# Patient Record
Sex: MALE | Race: WHITE | Employment: FULL TIME | URBAN - METROPOLITAN AREA
[De-identification: names, ages, dates, MRNs, and addresses within clinical notes are randomized per-mention and may not be internally consistent; named-entity substitution may affect disease eponyms.]

---

## 2018-11-23 ENCOUNTER — OFFICE VISIT (OUTPATIENT)
Dept: FAMILY MEDICINE | Facility: CLINIC | Age: 28
End: 2018-11-23
Payer: COMMERCIAL

## 2018-11-23 VITALS
HEART RATE: 98 BPM | WEIGHT: 164.4 LBS | TEMPERATURE: 98.7 F | DIASTOLIC BLOOD PRESSURE: 68 MMHG | HEIGHT: 70 IN | SYSTOLIC BLOOD PRESSURE: 114 MMHG | OXYGEN SATURATION: 98 % | BODY MASS INDEX: 23.54 KG/M2

## 2018-11-23 DIAGNOSIS — F90.2 ATTENTION DEFICIT HYPERACTIVITY DISORDER (ADHD), COMBINED TYPE: Primary | ICD-10-CM

## 2018-11-23 PROCEDURE — 99213 OFFICE O/P EST LOW 20 MIN: CPT | Performed by: NURSE PRACTITIONER

## 2018-11-23 RX ORDER — DEXTROAMPHETAMINE SACCHARATE, AMPHETAMINE ASPARTATE, DEXTROAMPHETAMINE SULFATE AND AMPHETAMINE SULFATE 5; 5; 5; 5 MG/1; MG/1; MG/1; MG/1
TABLET ORAL
Refills: 0 | COMMUNITY
Start: 2018-08-27 | End: 2018-11-23

## 2018-11-23 RX ORDER — DEXTROAMPHETAMINE SACCHARATE, AMPHETAMINE ASPARTATE, DEXTROAMPHETAMINE SULFATE AND AMPHETAMINE SULFATE 5; 5; 5; 5 MG/1; MG/1; MG/1; MG/1
20 TABLET ORAL 2 TIMES DAILY
Qty: 60 TABLET | Refills: 0 | Status: SHIPPED | OUTPATIENT
Start: 2018-11-23 | End: 2018-12-20

## 2018-11-23 NOTE — PROGRESS NOTES
SUBJECTIVE:   Jemal Fu is a 28 year old male who presents to clinic today for the following health issues:  New Patient/Transfer of Care  [facility] . Switching due to insurance change. *records request completed    Would like to restart adderall 20mg twice daily. 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 Denies: tachycardia, decreased appetite, HA's or insomnia.                                                                                                      Some-                                                                        Never   Rarely      times       Often  Very Often  1. How often do you have trouble wrapping up the final details of a project,  once the challenging parts have been done?   X     2. How often do you have difficulty getting things in order when you have to do a task that requires organization?   X     3. How often do you have problems remembering appointments or obligations?    X    4. When you have a task that requires a lot of thought, how often do you avoid or delay getting started?   X     5. How often do you fidget or squirm with your hands or feet when you have to sit down for a long time?     X   6. How often do you feel overly active and compelled to do things, like you were driven by a motor?    X      Part A                                                        7. How often do you make careless mistakes when you have to work on a boring or difficult project?     X   8. How often do you have difficulty keeping your attention when you are doing boring or repetitive work?    X    9. How often do you have difficulty concentrating on what people say to you, even when they are speaking to you directly?   X     10. How often do you misplace or have difficulty finding things at home or at work?   X     11. How often are you distracted by activity or  "noise around you?   X     12. How often do you leave your seat in meetings or other situations in which you are expected to remain seated?     X     13. How often do you feel restless or fidgety?     X     14. How often do you have difficulty unwinding and relaxing when you have time to yourself?   X     15. How often do you find yourself talking too much when you are in social situations?   X     16. When you're in a conversation, how often do you find yourself finishing the sentences of the people you are talking to, before they can finish them themselves?  X      17. How often do you have difficulty waiting your turn in situations when turn-taking is required?  X      18. How often do you interrupt others when they are busy?  X        [facility]    Problem list and histories reviewed & adjusted, as indicated.  Additional history: as documented    There is no problem list on file for this patient.    No past surgical history on file.    Social History   Substance Use Topics     Smoking status: Current Every Day Smoker     Smokeless tobacco: Not on file     Alcohol use No     No family history on file.      Current Outpatient Prescriptions   Medication Sig Dispense Refill     amphetamine-dextroamphetamine (ADDERALL) 20 MG tablet Take 1 tablet (20 mg) by mouth 2 times daily 60 tablet 0     Allergies   Allergen Reactions     Nkda [No Known Drug Allergies]        Reviewed and updated as needed this visit by clinical staff  Allergies  Meds  Problems       Reviewed and updated as needed this visit by Provider  Allergies  Meds  Problems         ROS:  CONSTITUTIONAL: NEGATIVE for fever, chills, change in weight  RESP: NEGATIVE for significant cough or SOB  CV: NEGATIVE for chest pain, palpitations or peripheral edema  PSYCHIATRIC: NEGATIVE for changes in mood or affect  ROS otherwise negative    OBJECTIVE:     /68  Pulse 98  Temp 98.7  F (37.1  C) (Tympanic)  Ht 5' 9.75\" (1.772 m)  Wt 164 lb 6.4 oz " (74.6 kg)  SpO2 98%  BMI 23.76 kg/m2  Body mass index is 23.76 kg/(m^2).  GENERAL: healthy, alert and no distress  RESP: lungs clear to auscultation - no rales, rhonchi or wheezes  CV: regular rate and rhythm, normal S1 S2, no S3 or S4, no murmur, click or rub, no peripheral edema and peripheral pulses strong  ABDOMEN: soft, nontender, no hepatosplenomegaly, no masses and bowel sounds normal  PSYCH: mentation appears normal, affect normal/bright    Diagnostic Test Results:  none     ASSESSMENT/PLAN:     1. Attention deficit hyperactivity disorder (ADHD), combined type  Reviewed patient's last adderall fill from [facility] P[facility].  Reviewed [place] PDMP and patient's last fill was in September per his records.  Patient has no abuse with the medication and is timely on his refills.  One month refill given with recommendation to establish care for ongoing evaluation on controlled substance.  - amphetamine-dextroamphetamine (ADDERALL) 20 MG tablet; Take 1 tablet (20 mg) by mouth 2 times daily  Dispense: 60 tablet; Refill: 0    See Patient Instructions    Tamy De La Fuente NP  [facility]

## 2018-11-23 NOTE — MR AVS SNAPSHOT
"              After Visit Summary   11/23/2018    Jemal Fu    MRN: [id number]           Patient Information     Date Of Birth          1990        Visit Information        Provider Department      11/23/2018 3:00 PM Tamy De La Fuente NP [facility]        Today's Diagnoses     Attention deficit hyperactivity disorder (ADHD), combined type    -  1      Care Instructions    1.  One month refill on Adderall.  2.  Make appointment to establish care with primary care provider (Dr. Meza (female) or Dr. Rudd (male)) to establish care and follow-up with Adderall prescriptions.          Follow-ups after your visit        Follow-up notes from your care team     Return in about 1 month (around 12/23/2018) for establish care/adderall visit.      Who to contact     If you have questions or need follow up information about today's clinic visit or your schedule please contact [facility] directly at [phone].  Normal or non-critical lab and imaging results will be communicated to you by MyChart, letter or phone within 4 business days after the clinic has received the results. If you do not hear from us within 7 days, please contact the clinic through [facility] or phone. If you have a critical or abnormal lab result, we will notify you by phone as soon as possible.  Submit refill requests through [facility] or call your pharmacy and they will forward the refill request to us. Please allow 3 business days for your refill to be completed.          Additional Information About Your Visit        [facility] Information     [facility] lets you send messages to your doctor, view your test results, renew your prescriptions, schedule appointments and more. To sign up, go to [email or web address] . Click on \"Log in\" on the left side of the screen, which will take you to the Welcome page. Then click on \"Sign up Now\" on the right side of the page.     You will be asked to enter the access code listed " "below, as well as some personal information. Please follow the directions to create your username and password.     Your access code is: [email or web address]  Expires: 2019  2:43 PM     Your access code will  in 90 days. If you need help or a new code, please call your [facility] or [phone].        Care EveryWhere ID     This is your Care EveryWhere ID. This could be used by other organizations to access your [facility] medical records  [id number]        Your Vitals Were     Pulse Temperature Height Pulse Oximetry BMI (Body Mass Index)       98 98.7  F (37.1  C) (Tympanic) 5' 9.75\" (1.772 m) 98% 23.76 kg/m2        Blood Pressure from Last 3 Encounters:   18 114/68   11 117/80   11 118/74    Weight from Last 3 Encounters:   18 164 lb 6.4 oz (74.6 kg)   11 164 lb 6.4 oz (74.6 kg) (63 %)*   04/03/10 165 lb (74.8 kg) (69 %)*     * Growth percentiles are based on [facility] 2-20 Years data.              Today, you had the following     No orders found for display         Today's Medication Changes          These changes are accurate as of 18  3:24 PM.  If you have any questions, ask your nurse or doctor.               These medicines have changed or have updated prescriptions.        Dose/Directions    amphetamine-dextroamphetamine 20 MG tablet   Commonly known as:  ADDERALL   This may have changed:  See the new instructions.   Used for:  Attention deficit hyperactivity disorder (ADHD), combined type        Dose:  20 mg   Take 1 tablet (20 mg) by mouth 2 times daily   Quantity:  60 tablet   Refills:  0         Stop taking these medicines if you haven't already. Please contact your care team if you have questions.     clindamycin 300 MG capsule   Commonly known as:  CLEOCIN                Where to get your medicines      Some of these will need a paper prescription and others can be bought over the counter.  Ask your nurse if you have questions.     Bring a paper prescription for " each of these medications     amphetamine-dextroamphetamine 20 MG tablet                Primary Care Provider Fax #    Physician No Ref-Primary [phone]       No address on file        Equal Access to Services     EVELIN PRADO : Hadii aad ku hadadrikelsy Flores, radhakatja watersjohnha, darcie robbieemeka vaughn, daniela rao laTerrellgovind santiago. So [facility] [phone].    ATENCIÓN: Si habla español, tiene a joseph disposición servicios gratuitos de asistencia lingüística. Llame al [phone].    We comply with applicable federal civil rights laws and [place] laws. We do not discriminate on the basis of race, color, national origin, age, disability, sex, sexual orientation, or gender identity.            Thank you!     Thank you for choosing [facility]  for your care. Our goal is always to provide you with excellent care. Hearing back from our patients is one way we can continue to improve our services. Please take a few minutes to complete the written survey that you may receive in the mail after your visit with us. Thank you!             Your Updated Medication List - Protect others around you: Learn how to safely use, store and throw away your medicines at www.disposemymeds.org.          This list is accurate as of 11/23/18  3:24 PM.  Always use your most recent med list.                   Brand Name Dispense Instructions for use Diagnosis    amphetamine-dextroamphetamine 20 MG tablet    ADDERALL    60 tablet    Take 1 tablet (20 mg) by mouth 2 times daily    Attention deficit hyperactivity disorder (ADHD), combined type

## 2018-11-23 NOTE — PATIENT INSTRUCTIONS
1.  One month refill on Adderall.  2.  Make appointment to establish care with primary care provider (Dr. Meza (female) or Dr. Rudd (male)) to establish care and follow-up with Adderall prescriptions.

## 2018-12-20 ENCOUNTER — OFFICE VISIT (OUTPATIENT)
Dept: FAMILY MEDICINE | Facility: CLINIC | Age: 28
End: 2018-12-20
Payer: COMMERCIAL

## 2018-12-20 VITALS
HEIGHT: 70 IN | WEIGHT: 159.8 LBS | HEART RATE: 99 BPM | OXYGEN SATURATION: 97 % | BODY MASS INDEX: 22.88 KG/M2 | TEMPERATURE: 98.5 F | DIASTOLIC BLOOD PRESSURE: 74 MMHG | SYSTOLIC BLOOD PRESSURE: 116 MMHG

## 2018-12-20 DIAGNOSIS — F90.2 ATTENTION DEFICIT HYPERACTIVITY DISORDER (ADHD), COMBINED TYPE: Primary | ICD-10-CM

## 2018-12-20 DIAGNOSIS — F12.90 MARIJUANA USE, EPISODIC: ICD-10-CM

## 2018-12-20 LAB
AMPHETAMINES UR QL: ABNORMAL NG/ML
BARBITURATES UR QL SCN: NOT DETECTED NG/ML
BENZODIAZ UR QL SCN: NOT DETECTED NG/ML
BUPRENORPHINE UR QL: NOT DETECTED NG/ML
CANNABINOIDS UR QL: ABNORMAL NG/ML
COCAINE UR QL SCN: ABNORMAL NG/ML
D-METHAMPHET UR QL: NOT DETECTED NG/ML
METHADONE UR QL SCN: NOT DETECTED NG/ML
OPIATES UR QL SCN: NOT DETECTED NG/ML
OXYCODONE UR QL SCN: NOT DETECTED NG/ML
PCP UR QL SCN: NOT DETECTED NG/ML
PROPOXYPH UR QL: NOT DETECTED NG/ML
TRICYCLICS UR QL SCN: NOT DETECTED NG/ML

## 2018-12-20 PROCEDURE — 99214 OFFICE O/P EST MOD 30 MIN: CPT | Performed by: FAMILY MEDICINE

## 2018-12-20 PROCEDURE — 80306 DRUG TEST PRSMV INSTRMNT: CPT | Performed by: FAMILY MEDICINE

## 2018-12-20 RX ORDER — DEXTROAMPHETAMINE SACCHARATE, AMPHETAMINE ASPARTATE, DEXTROAMPHETAMINE SULFATE AND AMPHETAMINE SULFATE 5; 5; 5; 5 MG/1; MG/1; MG/1; MG/1
20 TABLET ORAL 2 TIMES DAILY
Qty: 60 TABLET | Refills: 0 | Status: SHIPPED | OUTPATIENT
Start: 2018-12-21 | End: 2018-12-20

## 2018-12-20 RX ORDER — DEXTROAMPHETAMINE SACCHARATE, AMPHETAMINE ASPARTATE, DEXTROAMPHETAMINE SULFATE AND AMPHETAMINE SULFATE 5; 5; 5; 5 MG/1; MG/1; MG/1; MG/1
20 TABLET ORAL 2 TIMES DAILY
Qty: 60 TABLET | Refills: 0 | Status: SHIPPED | OUTPATIENT
Start: 2019-01-18 | End: 2019-02-14

## 2018-12-20 ASSESSMENT — MIFFLIN-ST. JEOR: SCORE: 1702.13

## 2018-12-20 NOTE — LETTER
[facility]  12/20/18    Patient: Jemal Fu  YOB: 1990  Medical Record Number: [id number]  CSN: [id number]                                                                              Non-opioid Controlled Substance Agreement    I understand that my care provider has prescribed a controlled substance to help manage my condition(s). I am taking this medicine to help me function or work. I know this is strong medicine, and that it can cause serious side effects. Controlled substances can be sedating, addicting and may cause a dependency on the drug. They can affect my ability to drive or think, and cause depression. They need to be taken exactly as prescribed. Combining controlled substances with certain medicines or chemicals (such as cocaine, sedatives and tranquilizers, sleeping pills, meth) can be dangerous or even fatal. Also, if I stop controlled substances suddenly, I may have severe withdrawal symptoms.  If not helpful, I may be asked to stop them.    The risks, benefits, and side effects of these medicine(s) were explained to me. I agree that:    1. I will take part in other treatments as advised by my care team. This may be psychiatry or counseling, physical therapy, behavioral therapy, group treatment or a referral to a pain clinic. I will reduce or stop my medicine when my care team tells me to do so.  2. I will take my medicines as prescribed. I will not change the dose or schedule unless my care team tells me to. There will be no refills if I  run out early.   I may be contactedwithout warning and asked to complete a urine drug test or pill count at any time.   3. I will keep all my appointments, and understand this is part of the monitoring of controlled substances. My care team may require an office visit for EVERY controlled substance refill. If I miss appointments or don t follow instructions, my care team may stop my medicine.  4. I will not ask other  providers to prescribe controlled substances, and I will not accept controlled substances from other people. If I need another prescribed controlled substance for a new reason, I will tell my care team within 1 business day.  5. I will use one pharmacy to fill all of my controlled substance prescriptions, and it is up to me to make sure that I do not run out of my medicines on weekends or holidays. If my care team is willing to refill my controlled substance prescription without a visit, I must request refills only during office hours, refills may take up to 3 days to process, and it may take up to 5 to 7 days for my medicine to be mailed and ready at my pharmacy. Prescriptions will not be mailed anywhere except my pharmacy.    6. I am responsible for my prescriptions. If the medicine/prescription is lost or stolen, it will not be replaced. I also agree not to share controlled substance medicines with anyone.              [facility]  12/20/18  Patient:  Jemal Fu  YOB: 1990  Medical Record Number: [id number]  [facility]: [id number]    7. I agree to not use ANY illegal or recreational drugs. This includes marijuana, cocaine, bath salts or other drugs. I agree not to use alcohol unless my care team says I may. I agree to give urine samples whenever asked. If I don t give a urine sample, the care team may stop my medicine.    8. If I enroll in the [place] Medical Marijuana program, I will tell my care team. I will also sign an agreement to share my medical records with my care team.    9. I will bring in my list of medicines (or my medicine bottles) each time I come to the clinic.   10. I will tell my care team right away if I become pregnant or have a new medical problem treated outside of my regular clinic.  11. I understand that this medicine can affect my thinking and judgment. It may be unsafe for me to drive, use machinery and do dangerous tasks. I will not do any of  these things until I know how the medicine affects me. If my dose changes, I will wait to see how it affects me. I will contact my care team if I have concerns about medicine side effects.    I understand that if I do not follow any of the conditions above, my prescriptions or treatment may be stopped.      I agree that my provider, clinic care team, and pharmacy may work with any city, state or federal law enforcement agency that investigates the misuse, sale, or other diversion of my controlled medicine. I will allow my provider to discuss my care with or share a copy of this agreement with any other treating provider, pharmacy or emergency room where I receive care. I agree to give up (waive) any right of privacy or confidentiality with respect to these consents.   I have read this agreement and have asked questions about anything I did not understand.    ____________________________________________________    ____________  ________  Patient signature                                                         Date      Time    ____________________________________________________     ____________  ________  Witness                                                          Date      Time    ____________________________________________________  Provider signature

## 2018-12-20 NOTE — PROGRESS NOTES
SUBJECTIVE:   Jemal Fu is a 28 year old male who presents to clinic today for the following health issues:  Chief Complaint   Patient presents with     A.D.H.YUMI     Pt here for a f/u on adderall.     Establish Care     Switching from [facility].       Medication Followup of adderall    Taking Medication as prescribed: yes    Side Effects:  None    Medication Helping Symptoms:  Yes    Patient denies appetite changes, sleep disruption, weight fluctuations, unusual behavior, mood swings or aggressive behavior.    Patient reports he got a promotion recently at work, so not having difficulty performing responsibility.     Pt has been taking adderall for 3 yrs.    After patient was advised about controlled substance agreement content and terms, he admitted he has smoked pot few weeks ago. He cannot give a date.  Patient states he does not smoke pot everyday.  He denies using any other street drugs.    Problem list and histories reviewed & adjusted, as indicated.  Additional history: as documented    Patient Active Problem List   Diagnosis     Marijuana use, episodic     Attention deficit hyperactivity disorder (ADHD), combined type     History reviewed. No pertinent surgical history.    Social History     Tobacco Use     Smoking status: Current Every Day Smoker     Packs/day: 0.50     Years: 9.00     Pack years: 4.50     Types: Cigarettes     Smokeless tobacco: Former User   Substance Use Topics     Alcohol use: No     Family History   Problem Relation Age of Onset     Diabetes Maternal Grandmother      Alzheimer Disease Paternal Grandmother          Current Outpatient Medications   Medication Sig Dispense Refill     [START ON 1/18/2019] amphetamine-dextroamphetamine (ADDERALL) 20 MG tablet Take 1 tablet (20 mg) by mouth 2 times daily 60 tablet 0     Allergies   Allergen Reactions     Nkda [No Known Drug Allergies]        Reviewed and updated as needed this visit by clinical staff  Tobacco  Allergies   "Meds  Problems  Med Hx  Surg Hx  Fam Hx  Soc Hx        Reviewed and updated as needed this visit by Provider  Tobacco  Allergies  Meds  Problems  Med Hx  Surg Hx  Fam Hx         ROS:  C: NEGATIVE for fever, chills, change in weight  I: NEGATIVE for worrisome rashes, moles or lesions  E: NEGATIVE for vision changes or irritation  E/M: NEGATIVE for ear, mouth and throat problems  R: NEGATIVE for significant cough or SOB  CV: NEGATIVE for chest pain, palpitations or peripheral edema  GI: NEGATIVE for nausea, abdominal pain, heartburn, or change in bowel habits  : NEGATIVE for frequency, dysuria, or hematuria  N: NEGATIVE for weakness, dizziness or paresthesias  E: NEGATIVE for temperature intolerance, skin/hair changes  PSYCHIATRIC: see above    OBJECTIVE:                                                    /74   Pulse 99   Temp 98.5  F (36.9  C)   Ht 1.772 m (5' 9.75\")   Wt 72.5 kg (159 lb 12.8 oz)   SpO2 97%   BMI 23.09 kg/m    Body mass index is 23.09 kg/m .  GENERAL:  alert and no distress  EYES: no icterus, PERRLA  SKIN: no jaundice/rash  NEURO: no tremors  PSYCH: well-kempt, linear thought process, normal speech, good insight/judgement, normal mood, appropriate affect, no suicidality, no aggression, no hallucination    Diagnostic test results:  Diagnostic Test Results:  none      ASSESSMENT/PLAN:                                                        ICD-10-CM    1. Attention deficit hyperactivity disorder (ADHD), combined type F90.2 Drug Abuse Screen Panel 13, Urine (Pain Care Package)     amphetamine-dextroamphetamine (ADDERALL) 20 MG tablet     Stable.  Gave two postdated Rx for med.  Reinforced to watch for ADR to med.    Patient  was advised of contents of controlled prescription agreement.  Patient  agreed to abide by the conditions, and agreed to sign.     2. Marijuana use, episodic F12.90 Patient was advised of adverse helath effects of marijuana use including but not limited to " affecting cognitive function as well as his condition above.  Advised patient to stop even occasional use from now on.  Reiterated terms of controlled med agreement; may consider non-controlled ADD med if with repeated positive UDS in the future.         Follow up with Provider - 2 months   Patient Instructions   Since your condition has been stable to date, no change in medication dose.  Postdated prescriptions given to you today: two  Take medications as directed.  You concurred to abide by the controlled medication agreement conditions.  You agreed to sign it today.  You concurred to have a urine drug screen today as baseline. This can be done at random times in the future for therapeutic and safety purposes.  You are advised to completely stop the use of recreational marijuana as it can adversely affect your condition, your overall health, and can even interact negatively with your Adderall.    Observe for the following side effects of your medication:    Common Side Effects:    decreased appetite  sleep problems  transient stomachache  transient headache  behavioral rebound    Call your doctor immediately if any infrequent side effects occur:   weight loss  increased heart rate and/or blood pressure  dizziness  growth suppression  hallucinations/eliu  exacerbation of tics and Tourette syndrome (rare)    Follow up in 2 months.      John Rudd MD  [facility]

## 2018-12-20 NOTE — PATIENT INSTRUCTIONS
Since your condition has been stable to date, no change in medication dose.  Postdated prescriptions given to you today: two  Take medications as directed.  You concurred to abide by the controlled medication agreement conditions.  You agreed to sign it today.  You concurred to have a urine drug screen today as baseline. This can be done at random times in the future for therapeutic and safety purposes.  You are advised to completely stop the use of recreational marijuana as it can adversely affect your condition, your overall health, and can even interact negatively with your Adderall.    Observe for the following side effects of your medication:    Common Side Effects:    decreased appetite  sleep problems  transient stomachache  transient headache  behavioral rebound    Call your doctor immediately if any infrequent side effects occur:   weight loss  increased heart rate and/or blood pressure  dizziness  growth suppression  hallucinations/eliu  exacerbation of tics and Tourette syndrome (rare)    Follow up in 2 months.

## 2018-12-21 ENCOUNTER — TELEPHONE (OUTPATIENT)
Dept: FAMILY MEDICINE | Facility: CLINIC | Age: 28
End: 2018-12-21

## 2018-12-21 NOTE — LETTER
December 26, 2018      Jemal Fu  [address]  [place] [address]        Dear Dr Blaire Joaquin received your recent urine study lab results.    Urine drug screen is positive for cannabinoids.  You reported this to Dr Rudd.  Urine drug screen is positive for cocaine also.  You stated that you did not use other street drugs.    Amphetamine is positive which is consistent if you take Adderall which is prescribed to you.    The Adderall prescription dated 12/20/18 was cancelled.    Since you are in violation of the Controlled Medication Agreement, and concurrent use of cocaine with a stimulant medication can cause serious cardiac events, no further adderall or amphetamine derivative meds will be prescribed.    Alternative meds for ADD can be considered and offered to you per Dr Rudd.  Please schedule a clinic visit with Dr Rudd to address alternate treatment for ADD by calling [phone].    Thank you.    Sincerely,        John Rudd MD/Ritu Reynoso RN        lar

## 2018-12-21 NOTE — TELEPHONE ENCOUNTER
Notes recorded by John Rudd MD on 12/20/2018 at 5:53 PM CST  UDS is positive for cannabinoids. Patient admitted to this.  UDS is positive for cocaine. Patient states he did not use other street drugs.  Amphetamine is positive which is consistent if he takes Adderall which is prescribed to him.    Already called [facility] pharmacy. Patient has not picked up Rx which is dated tomorrow.    Since patient is in violation of the controlled medication agreement, and concurrent use of cocaine with a stimulant medication can cause serious cardiac events, no further adderall or amphetamine derivative meds will be prescribed.  Alternative meds for ADD can be considered and offered to patient.    Please call patient about the above. Schedule clinic visit to address alternate treatment for ADD.    Left message for patient to return call to clinic.     Arabella Pimentel, RADHAN, RN

## 2018-12-26 NOTE — TELEPHONE ENCOUNTER
"Attempted to reach pt but whoever answered the phone hung up on me.  I re-attempted to reach pt in case it was an accidental hang up.  This time, the person whispered \"hello\" twice and then hung up.    Letter sent.    Ritu Reynoso RN      "

## 2019-02-14 ENCOUNTER — OFFICE VISIT (OUTPATIENT)
Dept: FAMILY MEDICINE | Facility: CLINIC | Age: 29
End: 2019-02-14
Payer: COMMERCIAL

## 2019-02-14 VITALS
TEMPERATURE: 98.4 F | RESPIRATION RATE: 14 BRPM | DIASTOLIC BLOOD PRESSURE: 80 MMHG | WEIGHT: 162 LBS | SYSTOLIC BLOOD PRESSURE: 122 MMHG | OXYGEN SATURATION: 97 % | HEART RATE: 104 BPM | HEIGHT: 69 IN | BODY MASS INDEX: 23.99 KG/M2

## 2019-02-14 DIAGNOSIS — R82.5 POSITIVE URINE DRUG SCREEN: ICD-10-CM

## 2019-02-14 DIAGNOSIS — F90.2 ATTENTION DEFICIT HYPERACTIVITY DISORDER (ADHD), COMBINED TYPE: Primary | ICD-10-CM

## 2019-02-14 DIAGNOSIS — F12.90 MARIJUANA USE, EPISODIC: ICD-10-CM

## 2019-02-14 PROCEDURE — 99214 OFFICE O/P EST MOD 30 MIN: CPT | Performed by: FAMILY MEDICINE

## 2019-02-14 RX ORDER — DEXTROAMPHETAMINE SACCHARATE, AMPHETAMINE ASPARTATE, DEXTROAMPHETAMINE SULFATE AND AMPHETAMINE SULFATE 5; 5; 5; 5 MG/1; MG/1; MG/1; MG/1
20 TABLET ORAL 2 TIMES DAILY
Qty: 60 TABLET | Refills: 0 | Status: CANCELLED | OUTPATIENT
Start: 2019-02-14

## 2019-02-14 RX ORDER — ATOMOXETINE 10 MG/1
10 CAPSULE ORAL DAILY
Qty: 30 CAPSULE | Refills: 0 | Status: SHIPPED | OUTPATIENT
Start: 2019-03-01

## 2019-02-14 RX ORDER — DEXTROAMPHETAMINE SACCHARATE, AMPHETAMINE ASPARTATE, DEXTROAMPHETAMINE SULFATE AND AMPHETAMINE SULFATE 2.5; 2.5; 2.5; 2.5 MG/1; MG/1; MG/1; MG/1
TABLET ORAL
Qty: 21 TABLET | Refills: 0 | Status: SHIPPED | OUTPATIENT
Start: 2019-02-14

## 2019-02-14 ASSESSMENT — MIFFLIN-ST. JEOR: SCORE: 1700.21

## 2019-02-14 NOTE — PATIENT INSTRUCTIONS
Taper off Adderall as instructed.  You may start strattera 10 mg daily on March 1, 2019.  Return to clinic end of March 2019.    Marijuana use, even recreational, has been known to have adverse health effects long-term. Street sources can be laced with other products.    You are strongly advised to stop all marijuana and other street substance use.

## 2019-02-14 NOTE — PROGRESS NOTES
"  SUBJECTIVE:   Jemal Fu is a 28 year old male who presents to clinic today for the following health issues:  Chief Complaint   Patient presents with     Refill Request     Pt here for refill on adderall.     Medication Followup of adderall    Taking Medication as prescribed: yes    Side Effects:  None    Medication Helping Symptoms:  Yes    Patient denies appetite changes, sleep disruption, weight fluctuations, unusual behavior, mood swings or aggressive behavior.    He asked about other non-amphetamine meds for ADD. He read up on atomoxetined and Vyvanse.     In December 2018, patient had initial UDS positive for cnnabinoids, cocaine and amphetamine.  Patient has admitted smoking marijuana at that visit but denied any other drug use.  Patient states he \"know how that happened. My yuniel may have given me a laced joint\".    Patient reitirates today that he cabrera snot use any other street drug aside from marijuana.    Verified above history with patient.      Problem list and histories reviewed & adjusted, as indicated.  Additional history: as documented    Patient Active Problem List   Diagnosis     Marijuana use, episodic     Attention deficit hyperactivity disorder (ADHD), combined type     History reviewed. No pertinent surgical history.    Social History     Tobacco Use     Smoking status: Current Every Day Smoker     Packs/day: 0.50     Years: 9.00     Pack years: 4.50     Types: Cigarettes     Smokeless tobacco: Former User   Substance Use Topics     Alcohol use: No     Family History   Problem Relation Age of Onset     Diabetes Maternal Grandmother      Alzheimer Disease Paternal Grandmother          Current Outpatient Medications   Medication Sig Dispense Refill     amphetamine-dextroamphetamine (ADDERALL) 10 MG tablet 10 mg orally twice a day x 7 days, then 10 mg orally once a day for 7 days, then stop. 21 tablet 0     [START ON 3/1/2019] atomoxetine (STRATTERA) 10 MG capsule Take 1 capsule (10 mg) " "by mouth daily 30 capsule 0     Allergies   Allergen Reactions     Nkda [No Known Drug Allergies]        Reviewed and updated as needed this visit by clinical staff  Tobacco  Allergies  Meds  Problems  Med Hx  Surg Hx  Fam Hx  Soc Hx        Reviewed and updated as needed this visit by Provider  Tobacco  Allergies  Meds  Problems  Med Hx  Surg Hx  Fam Hx         ROS:  C: NEGATIVE for fever, chills, change in weight  I: NEGATIVE for worrisome rashes, moles or lesions  E: NEGATIVE for vision changes or irritation  E/M: NEGATIVE for ear, mouth and throat problems  R: NEGATIVE for significant cough or SOB  CV: NEGATIVE for chest pain, palpitations or peripheral edema  GI: NEGATIVE for nausea, abdominal pain, heartburn, or change in bowel habits  : NEGATIVE for frequency, dysuria, or hematuria  N: NEGATIVE for weakness, dizziness or paresthesias  E: NEGATIVE for temperature intolerance, skin/hair changes  PSYCHIATRIC: see above    OBJECTIVE:                                                    /80   Pulse 104   Temp 98.4  F (36.9  C) (Tympanic)   Resp 14   Ht 1.753 m (5' 9\")   Wt 73.5 kg (162 lb)   SpO2 97%   BMI 23.92 kg/m    Body mass index is 23.92 kg/m .  GENERAL:  alert and no distress  EYES: no icterus, PERRLA  SKIN: no jaundice/rash  NEURO: no tremors  PSYCH: well-kempt, linear thought process, normal speech, good insight/judgement, normal mood, appropriate affect, no suicidality, no aggression, no hallucination    Diagnostic test results:  Diagnostic Test Results:  Results for orders placed or performed in visit on 12/20/18   Drug Abuse Screen Panel 13, Urine (Pain Care Package)   Result Value Ref Range    Cannabinoids (48-xkl-7-carboxy-9-THC) Detected, Abnormal Result (A) NDET^Not Detected ng/mL    Phencyclidine (Phencyclidine) Not Detected NDET^Not Detected ng/mL    Cocaine (Benzoylecgonine) Detected, Abnormal Result (A) NDET^Not Detected ng/mL    Methamphetamine (d-Methamphetamine) " Not Detected NDET^Not Detected ng/mL    Opiates (Morphine) Not Detected NDET^Not Detected ng/mL    Amphetamine (d-Amphetamine) Detected, Abnormal Result (A) NDET^Not Detected ng/mL    Benzodiazepines (Nordiazepam) Not Detected NDET^Not Detected ng/mL    Tricyclic Antidepressants (Desipramine) Not Detected NDET^Not Detected ng/mL    Methadone (Methadone) Not Detected NDET^Not Detected ng/mL    Barbiturates (Butalbital) Not Detected NDET^Not Detected ng/mL    Oxycodone (Oxycodone) Not Detected NDET^Not Detected ng/mL    Propoxyphene (Norpropoxyphene) Not Detected NDET^Not Detected ng/mL    Buprenorphine (Buprenorphine) Not Detected NDET^Not Detected ng/mL        ASSESSMENT/PLAN:                                                        ICD-10-CM    1. Attention deficit hyperactivity disorder (ADHD), combined type F90.2 amphetamine-dextroamphetamine (ADDERALL) 10 MG tablet     atomoxetine (STRATTERA) 10 MG capsule   2. Marijuana use, episodic F12.90    3. Positive urine drug screen R82.5      Patient was advised of his UDS results. Reviewed with him contents of the controlled rx agreement.  While he assures provider he does not actively use cocaine, he still state he uses marijuana.  There is concern about laced marijuana products so there is even risk of unknown substances that can be ingested.   Discussed how these street drugs can adversely affect him, even so as he uses an amphetamine derivative.  Patient was advised we will plan to taper off aderall.  Discussed options of Atomoxetine and Bupropion as alternatives.  Patient would like to switch to atomoxetine.  Patient was advised to stop any illicit substance use. He did not commit fully.  Return precautions discussed and given to patient.        Follow up with Provider - 6 weeks.   Patient Instructions   Taper off Adderall as instructed.  You may start strattera 10 mg daily on March 1, 2019.  Return to clinic end of March 2019.    Marijuana use, even recreational,  has been known to have adverse health effects long-term. Street sources can be laced with other products.    You are strongly advised to stop all marijuana and other street substance use.      John Rudd MD  [facility]

## 2019-09-10 ENCOUNTER — MYC REFILL (OUTPATIENT)
Dept: FAMILY MEDICINE | Facility: CLINIC | Age: 29
End: 2019-09-10

## 2019-09-10 ENCOUNTER — MYC MEDICAL ADVICE (OUTPATIENT)
Dept: FAMILY MEDICINE | Facility: CLINIC | Age: 29
End: 2019-09-10

## 2019-09-10 DIAGNOSIS — F90.2 ATTENTION DEFICIT HYPERACTIVITY DISORDER (ADHD), COMBINED TYPE: ICD-10-CM

## 2019-09-10 RX ORDER — DEXTROAMPHETAMINE SACCHARATE, AMPHETAMINE ASPARTATE, DEXTROAMPHETAMINE SULFATE AND AMPHETAMINE SULFATE 2.5; 2.5; 2.5; 2.5 MG/1; MG/1; MG/1; MG/1
TABLET ORAL
Qty: 21 TABLET | Refills: 0 | Status: CANCELLED | OUTPATIENT
Start: 2019-09-10

## 2019-09-11 ENCOUNTER — MYC MEDICAL ADVICE (OUTPATIENT)
Dept: FAMILY MEDICINE | Facility: CLINIC | Age: 29
End: 2019-09-11

## 2020-02-24 ENCOUNTER — HEALTH MAINTENANCE LETTER (OUTPATIENT)
Age: 30
End: 2020-02-24

## 2020-04-05 ENCOUNTER — HOSPITAL ENCOUNTER (EMERGENCY)
Facility: CLINIC | Age: 30
Discharge: HOME | End: 2020-04-05
Attending: FAMILY MEDICINE | Admitting: FAMILY MEDICINE

## 2020-04-05 VITALS
BODY MASS INDEX: 25.1 KG/M2 | SYSTOLIC BLOOD PRESSURE: 128 MMHG | RESPIRATION RATE: 16 BRPM | OXYGEN SATURATION: 99 % | DIASTOLIC BLOOD PRESSURE: 87 MMHG | TEMPERATURE: 97.9 F | WEIGHT: 170 LBS

## 2020-04-05 DIAGNOSIS — R10.11 RUQ ABDOMINAL PAIN: ICD-10-CM

## 2020-04-05 PROCEDURE — G0463 HOSPITAL OUTPT CLINIC VISIT: HCPCS | Performed by: FAMILY MEDICINE

## 2020-04-05 PROCEDURE — 99214 OFFICE O/P EST MOD 30 MIN: CPT | Mod: Z6 | Performed by: FAMILY MEDICINE

## 2020-04-05 ASSESSMENT — ENCOUNTER SYMPTOMS
NAUSEA: 1
BLOOD IN STOOL: 0
WHEEZING: 0
DIAPHORESIS: 0
SORE THROAT: 0
PALPITATIONS: 0
SINUS PRESSURE: 0
FREQUENCY: 0
HEADACHES: 0
COUGH: 0
CONSTIPATION: 0
DIARRHEA: 1
VOMITING: 1
FEVER: 0
SHORTNESS OF BREATH: 0
DYSURIA: 0
ABDOMINAL PAIN: 1
CHILLS: 0

## 2020-04-05 NOTE — ED PROVIDER NOTES
History     Chief Complaint   Patient presents with     Abdominal Pain     has had for 3 weeks, pain comes and goes started up again today after eating, sometimes he gets N/V/D with it as he did today      Nausea, Vomiting, & Diarrhea     HPI  Jemal Fu is a 29 year old male has a history of intermittent marijuana use and attention deficit disorder who presents with off-and-on abdominal pain for the last 3 weeks.  He occasionally has nausea vomiting diarrhea including today.    Presents with on and off symptoms for the last month of right upper quadrant abdominal pain periodic nausea vomiting nonbilious nonbloody and diarrhea.  Today's episode of diarrhea followed him using a protein shake.  He has no known food intolerances.  He does note pain can follow eating a fatty meal or fried meal.  Typically his symptoms come on after eating within the first hour.  He has been using recent Pepto-Bismol so that stools are darker but no black tarry stools or bloody stool seen.  No lightheadedness, syncope.  No dysuria urgency frequency or hematuria.  No fever.  He has been tolerating other food and fluids.      Recent urine drug screen has been positive for cannabinoids cocaine and amphetamine.  He does admit to use of periodic marijuana but not every day and uses 2 packs of tobacco cigarettes in a week.  Denies alcohol use.  Uses caffeine excessively.  No NSAID use.      Allergies:  Allergies   Allergen Reactions     Nkda [No Known Drug Allergies]        Problem List:    Patient Active Problem List    Diagnosis Date Noted     Positive urine drug screen 02/14/2019     Priority: Medium     Marijuana use, episodic 12/20/2018     Priority: Medium     Attention deficit hyperactivity disorder (ADHD), combined type 12/20/2018     Priority: Medium        Past Medical History:    Past Medical History:   Diagnosis Date     ADHD        Past Surgical History:    No past surgical history on file.    Family History:    Family  History   Problem Relation Age of Onset     Diabetes Maternal Grandmother      Alzheimer Disease Paternal Grandmother        Social History:  Marital Status:  Single [1]  Social History     Tobacco Use     Smoking status: Current Every Day Smoker     Packs/day: 0.50     Years: 9.00     Pack years: 4.50     Types: Cigarettes     Smokeless tobacco: Former User   Substance Use Topics     Alcohol use: No     Drug use: No        Medications:    amphetamine-dextroamphetamine (ADDERALL) 10 MG tablet  atomoxetine (STRATTERA) 10 MG capsule          Review of Systems   Constitutional: Negative for chills, diaphoresis and fever.   HENT: Negative for ear pain, sinus pressure and sore throat.    Eyes: Negative for visual disturbance.   Respiratory: Negative for cough, shortness of breath and wheezing.    Cardiovascular: Negative for chest pain and palpitations.   Gastrointestinal: Positive for abdominal pain, diarrhea, nausea and vomiting. Negative for blood in stool and constipation.   Genitourinary: Negative for dysuria, frequency and urgency.   Skin: Negative for rash.   Neurological: Negative for headaches.   All other systems reviewed and are negative.      Physical Exam   BP: 128/87  Heart Rate: 75  Temp: 97.9  F (36.6  C)  Resp: 16  Weight: 77.1 kg (170 lb)  SpO2: 99 %      Physical Exam  Constitutional:       General: He is not in acute distress.     Appearance: He is not ill-appearing or toxic-appearing.   HENT:      Mouth/Throat:      Pharynx: Oropharynx is clear.   Cardiovascular:      Rate and Rhythm: Normal rate and regular rhythm.      Heart sounds: No murmur.   Pulmonary:      Effort: Pulmonary effort is normal. No respiratory distress.      Breath sounds: Normal breath sounds. No stridor. No wheezing.   Abdominal:      General: Abdomen is flat. Bowel sounds are normal. There is no distension or abdominal bruit. There are no signs of injury.      Palpations: Abdomen is soft. There is no hepatomegaly or  splenomegaly.      Tenderness: There is no abdominal tenderness. There is no right CVA tenderness, left CVA tenderness, guarding or rebound. Negative signs include Garcia's sign and McBurney's sign.      Hernia: No hernia is present.   Skin:     Coloration: Skin is not jaundiced.      Findings: No rash.   Neurological:      Mental Status: He is alert.         ED Course        Procedures               Critical Care time:  none               No results found for this or any previous visit (from the past 24 hour(s)).    Medications - No data to display    Assessments & Plan (with Medical Decision Making)     MDM:Jemal Fu is a 29 year old male who presents with right upper quadrant abdominal pain periodically over the last month and occasional nausea vomiting diarrhea.  Typically brought on by food and is afebrile here normal vital signs.  He has a benign examination.  I recommended that the patient undergo possible right upper quadrant ultrasound liver function testing chemistry panel CBC and lipase.  This is to cover for differential diagnosis of biliary colic, gastritis/peptic ulcer disease and pancreatitis.  Other possible causes include intolerances versus allergens including gluten sensitive enteropathy.  Patient has no insurance and wishes to forego testing is much as possible and we discussed making this an urgent care visit and doing no testing but the risk of missing diagnoses as noted above including some that can be quite serious including bleeding peptic ulcers.  He still wishes to forego the testing and made recommendations as below with precautions for return  I have reviewed the nursing notes.    I have reviewed the findings, diagnosis, plan and need for follow up with the patient.       New Prescriptions    No medications on file       Final diagnoses:   RUQ abdominal pain - per your request we have limited testing to avoid extra cost, but this also adds some risk of missing bleeding peptic  ulcer, cholecystitis (gall bladder), pancreatitis.  Please return immed for fever, increased pain, blood in stool or black stools.  avoid fatty or fried foods (GB),  avoid the protein shakes (may have allergens or irritants), avoid tobacco, ibuprofen, caffeine.  Stay hydrated with 64 oz non-caffeine fluid per day.  take prilosec 20 mg orally daily for at least 1 month.  take pepcid or zantac OTC for the first week.  follow-up clinic and consider RUQ ultrasound, lipase, liver test and Hgb.       4/5/2020   [facility] EMERGENCY DEPARTMENT     Humberto Dial MD  04/05/20 [id number]

## 2020-04-05 NOTE — ED AVS SNAPSHOT
[facility] Emergency Department  [address] [address]  Phone:  [phone]  Fax:  [phone]                                    Jemal Fu   MRN: [id number]    Department:  [facility] Emergency Department   Date of Visit:  4/5/2020           After Visit Summary Signature Page    I have received my discharge instructions, and my questions have been answered. I have discussed any challenges I see with this plan with the nurse or doctor.    ..........................................................................................................................................  Patient/Patient Representative Signature      ..........................................................................................................................................  Patient Representative Print Name and Relationship to Patient    ..................................................               ................................................  Date                                   Time    ..........................................................................................................................................  Reviewed by Signature/Title    ...................................................              ..............................................  Date                                               Time          22EPIC Rev 08/18

## 2020-04-05 NOTE — ED NOTES
Has been having upper gastric pain one and off for a month along with loose stools. Pain started this am after drinking a meal replacement shake which has happened in the past. His diarrhea comes and goes also takes in high amounts of caffeine. Pain usually starts about an hour after eating. Has used many different OTC with no help. Pt has been on Goggle and is worried he has a stomach ulcer. He also states the pain seems to get worse after eating non healthy meals. He is a/o x 4. Denies fever/chills, CP, SOB. Pt also has concerns about his bill and would like not to have to many test done as he is unsure if his insurance has kicked in yet and would like to talk about options

## 2020-04-05 NOTE — DISCHARGE INSTRUCTIONS
ICD-10-CM    1. RUQ abdominal pain  R10.11     per your request we have limited testing to avoid extra cost, but this also adds some risk of missing bleeding peptic ulcer, cholecystitis (gall bladder), pancreatitis.  Please return immed for fever, increased pain, blood in stool or black stools.  avoid fatty or fried foods (GB),  avoid the protein shakes (may have allergens or irritants), avoid tobacco, ibuprofen, caffeine.  Stay hydrated with 64 oz non-caffeine fluid per day.  take prilosec 20 mg orally daily for at least 1 month.  take pepcid or zantac OTC for the first week.  follow-up clinic and consider RUQ ultrasound, lipase, liver test and Hgb.

## 2020-12-13 ENCOUNTER — HEALTH MAINTENANCE LETTER (OUTPATIENT)
Age: 30
End: 2020-12-13

## 2021-04-17 ENCOUNTER — HEALTH MAINTENANCE LETTER (OUTPATIENT)
Age: 31
End: 2021-04-17

## 2021-09-26 ENCOUNTER — HEALTH MAINTENANCE LETTER (OUTPATIENT)
Age: 31
End: 2021-09-26

## 2021-12-18 ENCOUNTER — OFFICE VISIT (OUTPATIENT)
Dept: URGENT CARE | Facility: URGENT CARE | Age: 31
End: 2021-12-18
Payer: COMMERCIAL

## 2021-12-18 VITALS
TEMPERATURE: 97.8 F | BODY MASS INDEX: 25.84 KG/M2 | SYSTOLIC BLOOD PRESSURE: 124 MMHG | WEIGHT: 175 LBS | RESPIRATION RATE: 18 BRPM | DIASTOLIC BLOOD PRESSURE: 77 MMHG | OXYGEN SATURATION: 96 % | HEART RATE: 81 BPM

## 2021-12-18 DIAGNOSIS — B08.4 HAND, FOOT AND MOUTH DISEASE: ICD-10-CM

## 2021-12-18 DIAGNOSIS — R07.0 THROAT PAIN: Primary | ICD-10-CM

## 2021-12-18 LAB
DEPRECATED S PYO AG THROAT QL EIA: NEGATIVE
GROUP A STREP BY PCR: NOT DETECTED

## 2021-12-18 PROCEDURE — 87651 STREP A DNA AMP PROBE: CPT | Performed by: NURSE PRACTITIONER

## 2021-12-18 PROCEDURE — 99213 OFFICE O/P EST LOW 20 MIN: CPT | Performed by: NURSE PRACTITIONER

## 2021-12-18 RX ORDER — DIPHENHYDRAMINE HYDROCHLORIDE AND LIDOCAINE HYDROCHLORIDE AND ALUMINUM HYDROXIDE AND MAGNESIUM HYDRO
5-10 KIT EVERY 6 HOURS PRN
Qty: 119 ML | Refills: 0 | Status: SHIPPED | OUTPATIENT
Start: 2021-12-18

## 2021-12-18 NOTE — PATIENT INSTRUCTIONS
Patient Education   Coping with Hand-and-Foot Syndrome  What is hand-and-foot syndrome?  Hand-and-foot syndrome is caused by certain chemotherapy drugs. They may cause tissue damage and other symptoms on the palms of the hands and soles of the feet.  Symptoms include:    Pain, tenderness, swelling, tingling or burning    Red or dark pink skin    Ulcers, blisters or sores    Peeling skin.  How is it treated?  If you are being treated with chemotherapy, we may stop your treatment until your hands and feet have time to heal. Healing may take from two to four weeks.  What else can I do to treat or prevent hand-and-foot syndrome?    Use a cold, wet washcloth on painful areas several times a day.    Use a thick, unscented, alcohol-free lotion (such as udderbalm or Aquaphor) on hands 4 times per day and feet 2 times per day. Apply it with a gentle touch--do not rub your palms and soles too long.    Keep your skin clean. Wash with mild soap and cool or warm water.    Avoid very cold or hot temperatures.    Keep your nails and cuticles trimmed and clean. Try to prevent scratches, snags and hangnails.    Wear shoes that fit well. They should be low-heeled and allow feet to breathe. Avoid tight socks and gloves.    Try not to put pressure on your skin. For example, do not kneel or lean on your elbows for a long time.    Avoid activities that cause friction. For example, avoid drying your hands with a rough towel, typing and sweeping.    Protect your hands and feet from injury:  ? Wear slippers or shoes with closed toes, even around the house.  ? Wear gloves while using strong cleaning products.  ? Use padded gloves when reaching into the oven or picking up hot pots and pans. Be careful not to burn yourself when cooking or ironing.  ? Wear heavy work gloves when you dig in the garden or work around thorny plants.  ? Be careful not to cut yourself when using knives, nail clippers, scissors or other tools.  When should I call  my care team?  Call your care team if:    You have pain that makes it hard to do daily activities.    You have discomfort that keeps you from eating, dressing or sleeping.    You have open wounds, ulcers or sores on your palms of your hands or soles of your feet.    You have any symptoms months after treatment is finished.  Comments:  __________________________________________  __________________________________________  __________________________________________  __________________________________________  __________________________________________  __________________________________________  __________________________________________  For informational purposes only. Not to replace the advice of your health care provider. Copyright   2006 [employer]. All rights reserved. Clinically reviewed by [employer]. [employer] 599290 - REV 04/19.

## 2021-12-18 NOTE — PROGRESS NOTES
SUBJECTIVE:   Jemal Fu  is a 31 year old male who is here today because of: Sore Throat.  The patient has had symptoms of fever.   Onset of symptoms was 5 day ago. Course of illness is worsening.  Patient admits to exposure to illness at home or work/school.   Patient denies   Treatment measures tried include acetaminophen, ibuprofen.    Past Medical History:   Diagnosis Date     ADHD        Social History     Tobacco Use     Smoking status: Current Every Day Smoker     Packs/day: 0.50     Years: 9.00     Pack years: 4.50     Types: Cigarettes     Smokeless tobacco: Former User   Substance Use Topics     Alcohol use: No     Drug use: No       ROS:  Review of systems negative except as stated above.      OBJECTIVE:   /77   Pulse 81   Temp 97.8  F (36.6  C) (Tympanic)   Resp 18   Wt 79.4 kg (175 lb)   SpO2 96%   BMI 25.84 kg/m    General: healthy, alert and no distress  Eyes - conjunctivae clear.  Ears - External ears normal. Canals clear. TM's normal.  Nose/Sinuses - Nares normal.Mucosa normal. No drainage or sinus tenderness.  Oropharynx - Lips, mucosa, and tongueOr normal. Positive findings: oropharyngeal erythema with red lesions no  tonsillar hypertrophy no exudates present,   Neck - Neck supple; Positive findings: moderate anterior cervical nodes, Lungs - Lungs clear; no wheezing or rales.  Heart - regular rate and rhythm. No murmurs, rub.  Skin: red lesions to hands     Labs:  Results for orders placed or performed in visit on 12/18/21   Streptococcus A Rapid Screen w/Reflex to PCR - Clinic Collect     Status: Normal    Specimen: Throat; Swab   Result Value Ref Range    Group A Strep antigen Negative Negative         ASSESSMENT:     ICD-10-CM    1. Throat pain  R07.0 Streptococcus A Rapid Screen w/Reflex to PCR - Clinic Collect     Group A Streptococcus PCR Throat Swab   2. Hand, foot and mouth disease  B08.4 magic mouthwash suspension, diphenhydrAMINE, lidocaine, aluminum-magnesium &  simethicone, (FIRST-MOUTHWASH BLM) compounding kit           PLAN:  See discharge    Mariel Suero CNP

## 2021-12-19 NOTE — RESULT ENCOUNTER NOTE
Group A Streptococcus PCR is NEGATIVE  No treatment or change in treatment [facility] ED lab result Strep Group A protocol.

## 2022-05-08 ENCOUNTER — HEALTH MAINTENANCE LETTER (OUTPATIENT)
Age: 32
End: 2022-05-08

## 2023-04-23 ENCOUNTER — HEALTH MAINTENANCE LETTER (OUTPATIENT)
Age: 33
End: 2023-04-23

## 2023-06-02 ENCOUNTER — HEALTH MAINTENANCE LETTER (OUTPATIENT)
Age: 33
End: 2023-06-02

## 2024-06-30 ENCOUNTER — HEALTH MAINTENANCE LETTER (OUTPATIENT)
Age: 34
End: 2024-06-30